# Patient Record
Sex: FEMALE
[De-identification: names, ages, dates, MRNs, and addresses within clinical notes are randomized per-mention and may not be internally consistent; named-entity substitution may affect disease eponyms.]

---

## 2023-02-13 ENCOUNTER — NURSE TRIAGE (OUTPATIENT)
Dept: OTHER | Facility: CLINIC | Age: 23
End: 2023-02-13

## 2023-02-14 NOTE — TELEPHONE ENCOUNTER
Location of patient: [place]    Received call from Viktor at [facility] with Red Flag Complaint. Jorge Tao MRN: [id number]    Provider: Aliyah MACK     Subjective: Caller states \"for the past 4 days, started having pain with urinating. Tried to increase my water but yesterday and today its got extremely worse, the pain in my abdomen, the frequency and I've had a fever since last night\"     Current Symptoms: urinary pain, frequency, urgency, abdominal pain, cloudy urine. Fever. Lower back and right flank pain     Associated Symptoms: reduced activity, increased wakefulness    Pain Severity: 7/10; cramping; constant    Temperature: 100.9 orally    What has been tried: increased water intake    Recommended disposition: Go to office now    Care advice provided, patient verbalizes understanding; denies any other questions or concerns.     Outcome:       No Appointments available patient referred to Urgent Care Center    This triage is a result of a call to the [facility] 258      Reason for Disposition   Fever > 100.4 F (38.0 C)    Protocols used: Urination Pain - Female-ADULT-[place]